# Patient Record
Sex: FEMALE | Race: WHITE | NOT HISPANIC OR LATINO | Employment: STUDENT | URBAN - METROPOLITAN AREA
[De-identification: names, ages, dates, MRNs, and addresses within clinical notes are randomized per-mention and may not be internally consistent; named-entity substitution may affect disease eponyms.]

---

## 2024-01-16 ENCOUNTER — TELEPHONE (OUTPATIENT)
Dept: PEDIATRIC HEMATOLOGY/ONCOLOGY | Facility: HOSPITAL | Age: 24
End: 2024-01-16
Payer: COMMERCIAL

## 2024-01-16 NOTE — TELEPHONE ENCOUNTER
Second attempt to call Saundra to set up March appointment for MRI and visit with Dr Schulte.  No answer. Message left on voicemail to call this RN's direct line at her earliest convenience.

## 2024-01-29 DIAGNOSIS — D49.7 GLIONEURONAL TUMOR: ICD-10-CM

## 2024-01-31 PROBLEM — R29.898 COMPLAINTS OF LEG WEAKNESS: Status: ACTIVE | Noted: 2024-01-31

## 2024-01-31 PROBLEM — R51.9 CHRONIC DAILY HEADACHE: Status: ACTIVE | Noted: 2024-01-31

## 2024-01-31 PROBLEM — G93.9 BRAIN LESION: Status: ACTIVE | Noted: 2024-01-31

## 2024-01-31 PROBLEM — R42 DIZZINESS: Status: ACTIVE | Noted: 2024-01-31

## 2024-01-31 PROBLEM — R51.9 HEADACHE, OCCIPITAL: Status: ACTIVE | Noted: 2024-01-31

## 2024-01-31 PROBLEM — R56.9 SEIZURE-LIKE ACTIVITY (MULTI): Status: ACTIVE | Noted: 2024-01-31

## 2024-01-31 PROBLEM — L81.3 CAFE-AU-LAIT SPOTS: Status: ACTIVE | Noted: 2024-01-31

## 2024-02-13 PROBLEM — D49.7 GLIONEURONAL TUMOR: Status: ACTIVE | Noted: 2024-02-13

## 2024-02-13 NOTE — PROGRESS NOTES
Patient ID: Saundra Lentz is a 23 y.o. female.  Referring Physician: Bulmaro Schulte MD  [id number] [facility]  Pediatrics-Hematology [place] [address]  Primary Care Provider: Dante Mcnamara MD    Date of Service:  2/21/2024    SUBJECTIVE:    History of Present Illness:  Saundra is a 24yo with an abnormal brain MRI in August 2017  (scan performed due to atypical headaches). 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    Due to radiographic progression, underwent radiation therapy, completed in September 2018.      Saundra was last seen in pediatric neuro-oncology clinic in March, 2023.  She returns today for MRI and routine clinical f/u. Here with her mom.      Since her last visit she denies any major illnesses, hospitalizations or ED visits. Denies major headaches and states she gets headaches a few times per month. No diplopia, or blurry vision. Denies nausea, vomiting or diarrhea. No seizures that she knows      No changes to PMH, SH or FH since last visit.                 Oncology History:    Oncology History Overview Note   Glioneuronal tumor, R basal ganglia  Conformal proton radiation to 50.4Gy (7/16/18-8/14/18)     Glioneuronal tumor   2/13/2024 Initial Diagnosis    Glioneuronal tumor         Past Medical / Family / Social History:  Past Medical, Family, and Social History reviewed and unchanged since the last visit.    Review of Systems   Constitutional: Negative.    HENT:  Negative.     Eyes: Negative.    Respiratory: Negative.     Cardiovascular: Negative.    Gastrointestinal: Negative.    Endocrine: Negative.    Genitourinary: Negative.     Musculoskeletal: Negative.    Skin: Negative.    Neurological: Negative.    Hematological: Negative.    Psychiatric/Behavioral: Negative.     All other systems reviewed and are negative.      Home Medication Adherence:  Adherence with home medication regimen: yes    Oral Chemotherapy / Oncology Related Therapy:  Is the patient  "prescribed oral chemotherapy or oncology related therapy:  No    OBJECTIVE:    VS:  /82 (BP Location: Right arm, Patient Position: Sitting, BP Cuff Size: Large adult)   Pulse 87   Temp 36.2 °C (97.2 °F) (Tympanic)   Resp 20   Ht 1.647 m (5' 4.84\")   Wt 80.4 kg (177 lb 4 oz)   BMI 29.64 kg/m²   BSA: 1.92 meters squared  Pain:       Physical Exam  Vitals reviewed.   HENT:      Head: Normocephalic.      Mouth/Throat:      Mouth: Mucous membranes are moist.   Eyes:      Extraocular Movements: Extraocular movements intact.      Conjunctiva/sclera: Conjunctivae normal.      Pupils: Pupils are equal, round, and reactive to light.   Cardiovascular:      Rate and Rhythm: Normal rate and regular rhythm.      Pulses: Normal pulses.      Heart sounds: Normal heart sounds.   Pulmonary:      Effort: Pulmonary effort is normal.      Breath sounds: Normal breath sounds.   Abdominal:      General: Bowel sounds are normal.      Palpations: Abdomen is soft.   Musculoskeletal:         General: Normal range of motion.      Cervical back: Normal range of motion.   Skin:     General: Skin is warm.   Neurological:      Mental Status: She is alert and oriented to person, place, and time.         Performance Status:       Imaging:  The pertinent imaging results were reviewed and discussed with the patient.  MR brain w and wo IV contrast    Result Date: 2/21/2024  Interpreted By:  Gilberto Walls, STUDY: MR BRAIN W AND WO IV CONTRAST; ;  2/21/2024 1:55 pm   INDICATION: Signs/Symptoms: 24yo with glioneuronal tumor s/p radiation therapy 5.5 years from completion due for tumor surveillance imaging  D49.7: Glioneuronal tumor.   COMPARISON: MRI brain from 03/15/2023.   ACCESSION NUMBER(S): [id number]   ORDERING CLINICIAN: ADDISON MELVIN   TECHNIQUE: MRI of the brain was performed with the acquisition of axial diffusion-weighted, axial T1, axial FLAIR, axial T2 gradient echo, axial T2 fat saturated, axial T1 fat saturated " postcontrast sequence, and volumetric axial T1 post contrast sequence with multiplanar reformats.   Contrast: 14 mL of Dotarem was injected intravenously.   FINDINGS: There is a stable cystic mass located within the right thalamus which measures 0.9 x 1.6 cm. There is a stable biopsy tract extending superiorly from this mass into the right parietal bone via the right frontal lobe. There is stable susceptibility artifact along the rim of this lesion with associated slight T1 hyperintense signal and slight enhancement along the lateral aspect.   Otherwise, there is no signal abnormality within the brain parenchyma. There is no acute intracranial hemorrhage or infarct. There is no abnormal extra-axial fluid collection.   The ventricles, sulci, and basilar cisterns are normal in size, shape, and configuration.   The visualized paranasal sinuses mastoid air cells are essentially clear, noting minimal mucosal thickening within the ethmoid air cells. The mastoid air cells are clear.       Stable cystic lesion located within the right thalamus.   This study was interpreted at [facility].   MACRO: None   Signed by: Gilberto Walls 2/21/2024 2:51 PM Dictation workstation:   [id number]      ASSESSMENT and PLAN:  22yo with glioneuronal tumor with history of slight radiographic progression who is now ~6 years post-completion of proton RT.    Saundra is well appearing in clinic today with stable MRI.     Oncology: S/p conformal proton radiation. (9/2018) due to radiographic progressive disease.  No evidence for progressive disease on exam or by imaging today. Stable imaging. Will continue with routine tumor surveillance  imaging annually, due 3/2025.     Neurology:  Will transition to Adult Neurology.      Optho: Encouraged annual Optho exams due to hx of brain tumor, last exam a few years ago per report.      RTC: 3/2025 for MRI, PE     Yolis York, APRN-CNP, DNP

## 2024-02-21 ENCOUNTER — HOSPITAL ENCOUNTER (OUTPATIENT)
Dept: RADIOLOGY | Facility: HOSPITAL | Age: 24
Discharge: HOME | End: 2024-02-21
Payer: COMMERCIAL

## 2024-02-21 ENCOUNTER — HOSPITAL ENCOUNTER (OUTPATIENT)
Dept: PEDIATRIC HEMATOLOGY/ONCOLOGY | Facility: HOSPITAL | Age: 24
Discharge: HOME | End: 2024-02-21
Payer: COMMERCIAL

## 2024-02-21 VITALS
SYSTOLIC BLOOD PRESSURE: 133 MMHG | WEIGHT: 177.25 LBS | HEART RATE: 87 BPM | TEMPERATURE: 97.2 F | BODY MASS INDEX: 29.53 KG/M2 | RESPIRATION RATE: 20 BRPM | DIASTOLIC BLOOD PRESSURE: 82 MMHG | HEIGHT: 65 IN

## 2024-02-21 DIAGNOSIS — D49.7 NEOPLASM OF UNSPECIFIED BEHAVIOR OF ENDOCRINE GLANDS AND OTHER PARTS OF NERVOUS SYSTEM: ICD-10-CM

## 2024-02-21 DIAGNOSIS — D49.7 GLIONEURONAL TUMOR: ICD-10-CM

## 2024-02-21 PROCEDURE — 2550000001 HC RX 255 CONTRASTS: Performed by: PEDIATRICS

## 2024-02-21 PROCEDURE — 99214 OFFICE O/P EST MOD 30 MIN: CPT | Performed by: NURSE PRACTITIONER

## 2024-02-21 PROCEDURE — A9575 INJ GADOTERATE MEGLUMI 0.1ML: HCPCS | Performed by: PEDIATRICS

## 2024-02-21 PROCEDURE — 70553 MRI BRAIN STEM W/O & W/DYE: CPT | Performed by: RADIOLOGY

## 2024-02-21 PROCEDURE — 70553 MRI BRAIN STEM W/O & W/DYE: CPT

## 2024-02-21 RX ORDER — GADOTERATE MEGLUMINE 376.9 MG/ML
14 INJECTION INTRAVENOUS
Status: COMPLETED | OUTPATIENT
Start: 2024-02-21 | End: 2024-02-21

## 2024-02-21 RX ADMIN — GADOTERATE MEGLUMINE 14 ML: 376.9 INJECTION INTRAVENOUS at 13:41

## 2024-02-21 ASSESSMENT — ENCOUNTER SYMPTOMS
DEPRESSION: 0
OCCASIONAL FEELINGS OF UNSTEADINESS: 0
LOSS OF SENSATION IN FEET: 0

## 2024-02-21 ASSESSMENT — PAIN SCALES - GENERAL: PAINLEVEL: 0-NO PAIN

## 2024-02-25 ASSESSMENT — ENCOUNTER SYMPTOMS
HEMATOLOGIC/LYMPHATIC NEGATIVE: 1
CONSTITUTIONAL NEGATIVE: 1
EYES NEGATIVE: 1
MUSCULOSKELETAL NEGATIVE: 1
PSYCHIATRIC NEGATIVE: 1
CARDIOVASCULAR NEGATIVE: 1
RESPIRATORY NEGATIVE: 1
ENDOCRINE NEGATIVE: 1
NEUROLOGICAL NEGATIVE: 1
GASTROINTESTINAL NEGATIVE: 1

## 2024-02-25 NOTE — ADDENDUM NOTE
Encounter addended by: Yolis York, APRN-CNP, DNP on: 2/25/2024 8:42 AM   Actions taken: SmartForm saved, Clinical Note Signed

## 2024-03-13 NOTE — ADDENDUM NOTE
Encounter addended by: Bulmaro Schulte MD on: 3/13/2024 9:23 AM   Actions taken: Level of Service modified, Cosign clinical note with attestation

## 2024-03-26 ENCOUNTER — HOSPITAL ENCOUNTER (OUTPATIENT)
Dept: RADIOLOGY | Facility: EXTERNAL LOCATION | Age: 24
Discharge: HOME | End: 2024-03-26

## 2024-03-26 DIAGNOSIS — R05.9 COUGH IN ADULT PATIENT: ICD-10-CM

## 2024-11-15 DIAGNOSIS — D49.7 GLIONEURONAL TUMOR: ICD-10-CM

## 2025-01-11 ENCOUNTER — OFFICE VISIT (OUTPATIENT)
Dept: URGENT CARE | Age: 25
End: 2025-01-11
Payer: COMMERCIAL

## 2025-01-11 VITALS
DIASTOLIC BLOOD PRESSURE: 81 MMHG | TEMPERATURE: 101.1 F | RESPIRATION RATE: 20 BRPM | OXYGEN SATURATION: 96 % | SYSTOLIC BLOOD PRESSURE: 143 MMHG | HEART RATE: 122 BPM

## 2025-01-11 DIAGNOSIS — R52 BODY ACHES: ICD-10-CM

## 2025-01-11 DIAGNOSIS — J10.1 INFLUENZA A: Primary | ICD-10-CM

## 2025-01-11 LAB
POC RAPID INFLUENZA A: POSITIVE
POC RAPID INFLUENZA B: NEGATIVE
POC SARS-COV-2 AG BINAX: NORMAL

## 2025-01-11 RX ORDER — OSELTAMIVIR PHOSPHATE 75 MG/1
75 CAPSULE ORAL EVERY 12 HOURS
Qty: 10 CAPSULE | Refills: 0 | Status: SHIPPED | OUTPATIENT
Start: 2025-01-11 | End: 2025-01-16

## 2025-01-11 ASSESSMENT — ENCOUNTER SYMPTOMS
SINUS PAIN: 0
FEVER: 1
RHINORRHEA: 0
NAUSEA: 0
SORE THROAT: 0
MYALGIAS: 1
SHORTNESS OF BREATH: 0
APPETITE CHANGE: 0
COUGH: 0
ACTIVITY CHANGE: 0
ABDOMINAL PAIN: 0
FATIGUE: 0
SINUS PRESSURE: 0
ARTHRALGIAS: 0

## 2025-01-11 NOTE — LETTER
January 11, 2025     Patient: Saundra Lentz   YOB: 2000   Date of Visit: 1/11/2025       To Whom It May Concern:    Saundra Lentz was seen in my clinic on 1/11/2025 . Please excuse Saundra for her absence from work on this day to make the appointment. May return to work 1/14/2025.    If you have any questions or concerns, please don't hesitate to call.         Sincerely,         Kandice Orellana PA-C        CC: No Recipients

## 2025-01-11 NOTE — PROGRESS NOTES
Subjective   Patient ID: Saundra Lentz is a 24 y.o. female. They present today with a chief complaint of Illness (Started yesterday, headaches, body aches.).    History of Present Illness  25 YO F C/O FEVER, BODY ACHES, STARTED LAST NIGHT.  NO OTC MEDICATIONS TODAY           Past Medical History  Allergies as of 01/11/2025    (No Known Allergies)       (Not in a hospital admission)       Past Medical History:   Diagnosis Date    Disorder of brain, unspecified 05/31/2018    Brain lesion    Other symptoms and signs involving the musculoskeletal system 10/05/2017    Complaints of leg weakness       History reviewed. No pertinent surgical history.         Review of Systems  Review of Systems   Constitutional:  Positive for fever. Negative for activity change, appetite change and fatigue.   HENT:  Negative for congestion, postnasal drip, rhinorrhea, sinus pressure, sinus pain and sore throat.    Respiratory:  Negative for cough and shortness of breath.    Gastrointestinal:  Negative for abdominal pain and nausea.   Musculoskeletal:  Positive for myalgias. Negative for arthralgias.   Skin:  Negative for rash.                                  Objective    Vitals:    01/11/25 1610   BP: 143/81   BP Location: Left arm   Patient Position: Sitting   BP Cuff Size: Adult   Pulse: (!) 122   Resp: 20   Temp: (!) 38.4 °C (101.1 °F)   TempSrc: Oral   SpO2: 96%     No LMP recorded (lmp unknown).    Physical Exam  Vitals and nursing note reviewed.   Constitutional:       Appearance: Normal appearance.   HENT:      Right Ear: Tympanic membrane, ear canal and external ear normal.      Left Ear: Tympanic membrane, ear canal and external ear normal.      Mouth/Throat:      Mouth: Mucous membranes are moist.      Pharynx: Oropharynx is clear.   Eyes:      Conjunctiva/sclera: Conjunctivae normal.   Cardiovascular:      Rate and Rhythm: Normal rate and regular rhythm.      Heart sounds: Normal heart sounds.   Pulmonary:      Effort:  Pulmonary effort is normal.      Breath sounds: Normal breath sounds.   Musculoskeletal:      Cervical back: Normal range of motion and neck supple.   Skin:     General: Skin is warm and dry.   Neurological:      Mental Status: She is alert and oriented to person, place, and time.         Procedures    Point of Care Test & Imaging Results from this visit  Results for orders placed or performed in visit on 01/11/25   POCT Influenza A/B manually resulted   Result Value Ref Range    POC Rapid Influenza A Positive (A) Negative    POC Rapid Influenza B Negative Negative   POCT Covid-19 Rapid Antigen   Result Value Ref Range    POC ISSA-COV-2 AG  Presumptive negative test for SARS-CoV-2 (no antigen detected)     Presumptive negative test for SARS-CoV-2 (no antigen detected)      No results found.    Diagnostic study results (if any) were reviewed by Kandice Orellana PA-C.    Assessment/Plan   Allergies, medications, history, and pertinent labs/EKGs/Imaging reviewed by Kandice Orellana PA-C.     Medical Decision Making      Orders and Diagnoses  Diagnoses and all orders for this visit:  Influenza A  Body aches  -     POCT Influenza A/B manually resulted  -     POCT Covid-19 Rapid Antigen      Medical Admin Record      Patient disposition: Home    Electronically signed by Kandice Orellana PA-C  4:19 PM

## 2025-02-18 ASSESSMENT — ENCOUNTER SYMPTOMS
EYES NEGATIVE: 1
MUSCULOSKELETAL NEGATIVE: 1
HEMATOLOGIC/LYMPHATIC NEGATIVE: 1
CARDIOVASCULAR NEGATIVE: 1
GASTROINTESTINAL NEGATIVE: 1
RESPIRATORY NEGATIVE: 1
ENDOCRINE NEGATIVE: 1
PSYCHIATRIC NEGATIVE: 1
CONSTITUTIONAL NEGATIVE: 1

## 2025-02-18 NOTE — PROGRESS NOTES
"Patient ID: Saundra Lentz is a 24 y.o. female.  Referring Physician: No referring provider defined for this encounter.  Primary Care Provider: Dante Mcnamara MD    Date of Service:  2/19/2025    SUBJECTIVE:    History of Present Illness:  Saundra is a 23yo with an abnormal brain MRI in August 2017  (scan performed due to atypical headaches). 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 Due to radiographic progression, underwent radiation therapy, completed in September 2018.      Saundra was last seen in pediatric neuro-oncology clinic in February, 2024.  She returns today for MRI and routine clinical f/u. Here with her mom and mom's camping friend.     Mom reports she continues to have tons of \"episodes.\" During 11/19 - 2/17 she has had 18 episodes. She describes the episodes as the following: she is sleeping and all of a sudden her eyes are closed and she \"mentally wakes up\" she cannot move any part of her body and she tries to force her body to move and feels like a weight on her body and she cannot open her eyes. Mom reports there was a time her previous boyfriend witnessed it and she had a shaking episode.     She has awake and sleep episodes. Awake episodes she has a weird feeling and body goes into a panic, skin is red, this all started prior to tumor and started on zoloft. Not as frequent.    Headache L eye x 3 days    Also had weird pain in head, mom unsure if it was stress related. She doesn't think it is a migraine. Sharp pain to L side of head 30 sec on and 30 sec off.     No majorillnesses, had the flu last month. Working as a . She does not have a PCP, has not seen neurology or optho in the past year.     No changes to PMH, SH or FH since last visit.               Oncology History:    Oncology History Overview Note   Glioneuronal tumor, R basal ganglia  Conformal proton radiation to 50.4Gy (7/16/18-8/14/18)     Glioneuronal tumor   2/13/2024 Initial Diagnosis    Glioneuronal " "tumor         Past Medical / Family / Social History:  Past Medical, Family, and Social History reviewed and unchanged since the last visit.    Review of Systems   Constitutional: Negative.    HENT:  Negative.     Eyes: Negative.    Respiratory: Negative.     Cardiovascular: Negative.    Gastrointestinal: Negative.    Endocrine: Negative.    Genitourinary: Negative.     Musculoskeletal: Negative.    Skin: Negative.    Neurological:  Positive for headaches.        \"Episodes\" see HPI   Hematological: Negative.    Psychiatric/Behavioral: Negative.     All other systems reviewed and are negative.      Home Medication Adherence:  Adherence with home medication regimen: yes    Oral Chemotherapy / Oncology Related Therapy:  Is the patient prescribed oral chemotherapy or oncology related therapy:  No    OBJECTIVE:    VS:  BP (!) 147/91 (BP Location: Right arm, Patient Position: Sitting, BP Cuff Size: Adult) Comment: first reading 149/95  Pulse 86   Temp 37.1 °C (98.7 °F) (Oral)   Resp 20   Ht 1.64 m (5' 4.57\")   Wt 84.5 kg (186 lb 4.6 oz)   BMI 31.42 kg/m²   BSA: 1.96 meters squared  Pain:       Physical Exam  Vitals reviewed.   HENT:      Head: Normocephalic.      Mouth/Throat:      Mouth: Mucous membranes are moist.   Eyes:      Extraocular Movements: Extraocular movements intact.      Conjunctiva/sclera: Conjunctivae normal.      Pupils: Pupils are equal, round, and reactive to light.   Cardiovascular:      Rate and Rhythm: Normal rate and regular rhythm.      Pulses: Normal pulses.      Heart sounds: Normal heart sounds.   Pulmonary:      Effort: Pulmonary effort is normal.      Breath sounds: Normal breath sounds.   Abdominal:      General: Bowel sounds are normal.      Palpations: Abdomen is soft.   Musculoskeletal:         General: Normal range of motion.      Cervical back: Normal range of motion.   Skin:     General: Skin is warm.   Neurological:      Mental Status: She is alert and oriented to person, place, " "and time.         Performance Status:       Imaging:  The pertinent imaging results were reviewed and discussed with the patient.  No MRI head results found for the past 14 days     ASSESSMENT and PLAN:  23yo with glioneuronal tumor with history of slight radiographic progression who is now ~6.5 years post-completion of proton RT.    Saundra is well appearing in clinic today with prelim MRI stable (final read pending).    Oncology: S/p conformal proton radiation. (9/2018) due to radiographic progressive disease.  No evidence for progressive disease on exam today, final MRI read is pending.  Will continue with routine tumor surveillance  imaging annually, due 3/2026.     Neurology:  Will transition to Adult Neurology. Referral placed today, encouraged an appointment with neurology due to her descriptions of \"episodes.\"     Optho: Encouraged annual Optho exams due to hx of brain tumor     RTC: 3/2026 for MRI, PE.    Yolis York, APRN-CNP, DNP  "

## 2025-02-19 ENCOUNTER — HOSPITAL ENCOUNTER (OUTPATIENT)
Dept: RADIOLOGY | Facility: HOSPITAL | Age: 25
Discharge: HOME | End: 2025-02-19
Payer: COMMERCIAL

## 2025-02-19 ENCOUNTER — HOSPITAL ENCOUNTER (OUTPATIENT)
Dept: PEDIATRIC HEMATOLOGY/ONCOLOGY | Facility: HOSPITAL | Age: 25
Discharge: HOME | End: 2025-02-19
Payer: COMMERCIAL

## 2025-02-19 VITALS
HEART RATE: 86 BPM | TEMPERATURE: 98.7 F | SYSTOLIC BLOOD PRESSURE: 147 MMHG | RESPIRATION RATE: 20 BRPM | WEIGHT: 186.29 LBS | DIASTOLIC BLOOD PRESSURE: 91 MMHG | BODY MASS INDEX: 31.04 KG/M2 | HEIGHT: 65 IN

## 2025-02-19 DIAGNOSIS — R51.9 HEADACHE, OCCIPITAL: ICD-10-CM

## 2025-02-19 DIAGNOSIS — G93.9 BRAIN LESION: Primary | ICD-10-CM

## 2025-02-19 DIAGNOSIS — R56.9 SEIZURE-LIKE ACTIVITY (MULTI): ICD-10-CM

## 2025-02-19 DIAGNOSIS — D49.7 GLIONEURONAL TUMOR: ICD-10-CM

## 2025-02-19 PROCEDURE — A9575 INJ GADOTERATE MEGLUMI 0.1ML: HCPCS | Performed by: PEDIATRICS

## 2025-02-19 PROCEDURE — 70553 MRI BRAIN STEM W/O & W/DYE: CPT

## 2025-02-19 PROCEDURE — 2550000001 HC RX 255 CONTRASTS: Performed by: PEDIATRICS

## 2025-02-19 RX ORDER — SERTRALINE HYDROCHLORIDE 50 MG/1
50 TABLET, FILM COATED ORAL DAILY
COMMUNITY

## 2025-02-19 RX ORDER — GADOTERATE MEGLUMINE 376.9 MG/ML
15 INJECTION INTRAVENOUS
Status: COMPLETED | OUTPATIENT
Start: 2025-02-19 | End: 2025-02-19

## 2025-02-19 RX ADMIN — GADOTERATE MEGLUMINE 15 ML: 376.9 INJECTION INTRAVENOUS at 14:17

## 2025-02-19 ASSESSMENT — PATIENT HEALTH QUESTIONNAIRE - PHQ9
2. FEELING DOWN, DEPRESSED OR HOPELESS: NOT AT ALL
SUM OF ALL RESPONSES TO PHQ9 QUESTIONS 1 AND 2: 0
1. LITTLE INTEREST OR PLEASURE IN DOING THINGS: NOT AT ALL

## 2025-02-19 ASSESSMENT — COLUMBIA-SUICIDE SEVERITY RATING SCALE - C-SSRS
2. HAVE YOU ACTUALLY HAD ANY THOUGHTS OF KILLING YOURSELF?: NO
6. HAVE YOU EVER DONE ANYTHING, STARTED TO DO ANYTHING, OR PREPARED TO DO ANYTHING TO END YOUR LIFE?: NO
1. IN THE PAST MONTH, HAVE YOU WISHED YOU WERE DEAD OR WISHED YOU COULD GO TO SLEEP AND NOT WAKE UP?: NO

## 2025-02-19 ASSESSMENT — PAIN SCALES - GENERAL: PAINLEVEL_OUTOF10: 0-NO PAIN

## 2025-02-19 ASSESSMENT — ENCOUNTER SYMPTOMS
LOSS OF SENSATION IN FEET: 0
HEADACHES: 1
OCCASIONAL FEELINGS OF UNSTEADINESS: 0
DEPRESSION: 0

## 2025-03-16 ENCOUNTER — HOSPITAL ENCOUNTER (EMERGENCY)
Facility: HOSPITAL | Age: 25
Discharge: HOME | End: 2025-03-16
Payer: COMMERCIAL

## 2025-03-16 VITALS
BODY MASS INDEX: 29.88 KG/M2 | HEART RATE: 92 BPM | HEIGHT: 64 IN | TEMPERATURE: 97.5 F | SYSTOLIC BLOOD PRESSURE: 137 MMHG | RESPIRATION RATE: 17 BRPM | DIASTOLIC BLOOD PRESSURE: 99 MMHG | WEIGHT: 175 LBS | OXYGEN SATURATION: 96 %

## 2025-03-16 DIAGNOSIS — N30.00 ACUTE CYSTITIS WITHOUT HEMATURIA: Primary | ICD-10-CM

## 2025-03-16 LAB
APPEARANCE UR: CLEAR
BACTERIA #/AREA URNS AUTO: ABNORMAL /HPF
BILIRUB UR STRIP.AUTO-MCNC: NEGATIVE MG/DL
COLOR UR: YELLOW
GLUCOSE UR STRIP.AUTO-MCNC: NORMAL MG/DL
HCG UR QL IA.RAPID: NEGATIVE
KETONES UR STRIP.AUTO-MCNC: ABNORMAL MG/DL
LEUKOCYTE ESTERASE UR QL STRIP.AUTO: ABNORMAL
MUCOUS THREADS #/AREA URNS AUTO: ABNORMAL /LPF
NITRITE UR QL STRIP.AUTO: NEGATIVE
PH UR STRIP.AUTO: 6 [PH]
PROT UR STRIP.AUTO-MCNC: ABNORMAL MG/DL
RBC # UR STRIP.AUTO: NEGATIVE MG/DL
RBC #/AREA URNS AUTO: ABNORMAL /HPF
SP GR UR STRIP.AUTO: 1.04
SQUAMOUS #/AREA URNS AUTO: ABNORMAL /HPF
UROBILINOGEN UR STRIP.AUTO-MCNC: NORMAL MG/DL
WBC #/AREA URNS AUTO: ABNORMAL /HPF

## 2025-03-16 PROCEDURE — 99283 EMERGENCY DEPT VISIT LOW MDM: CPT

## 2025-03-16 PROCEDURE — 81001 URINALYSIS AUTO W/SCOPE: CPT

## 2025-03-16 PROCEDURE — 87086 URINE CULTURE/COLONY COUNT: CPT | Mod: WESLAB

## 2025-03-16 PROCEDURE — 2500000001 HC RX 250 WO HCPCS SELF ADMINISTERED DRUGS (ALT 637 FOR MEDICARE OP)

## 2025-03-16 PROCEDURE — 81025 URINE PREGNANCY TEST: CPT

## 2025-03-16 PROCEDURE — 2500000002 HC RX 250 W HCPCS SELF ADMINISTERED DRUGS (ALT 637 FOR MEDICARE OP, ALT 636 FOR OP/ED)

## 2025-03-16 RX ORDER — FLUCONAZOLE 100 MG/1
200 TABLET ORAL ONCE
Status: COMPLETED | OUTPATIENT
Start: 2025-03-16 | End: 2025-03-16

## 2025-03-16 RX ORDER — NITROFURANTOIN 25; 75 MG/1; MG/1
100 CAPSULE ORAL 2 TIMES DAILY
Qty: 10 CAPSULE | Refills: 0 | Status: SHIPPED | OUTPATIENT
Start: 2025-03-16 | End: 2025-03-21

## 2025-03-16 RX ORDER — NITROFURANTOIN 25; 75 MG/1; MG/1
100 CAPSULE ORAL EVERY 12 HOURS SCHEDULED
Status: DISCONTINUED | OUTPATIENT
Start: 2025-03-16 | End: 2025-03-17 | Stop reason: HOSPADM

## 2025-03-16 RX ADMIN — FLUCONAZOLE 200 MG: 100 TABLET ORAL at 22:27

## 2025-03-16 RX ADMIN — NITROFURANTOIN MONOHYDRATE/MACROCRYSTALS 100 MG: 75; 25 CAPSULE ORAL at 22:28

## 2025-03-16 ASSESSMENT — LIFESTYLE VARIABLES
TOTAL SCORE: 0
HAVE YOU EVER FELT YOU SHOULD CUT DOWN ON YOUR DRINKING: NO
EVER HAD A DRINK FIRST THING IN THE MORNING TO STEADY YOUR NERVES TO GET RID OF A HANGOVER: NO
HAVE PEOPLE ANNOYED YOU BY CRITICIZING YOUR DRINKING: NO
EVER FELT BAD OR GUILTY ABOUT YOUR DRINKING: NO

## 2025-03-16 ASSESSMENT — COLUMBIA-SUICIDE SEVERITY RATING SCALE - C-SSRS
6. HAVE YOU EVER DONE ANYTHING, STARTED TO DO ANYTHING, OR PREPARED TO DO ANYTHING TO END YOUR LIFE?: NO
2. HAVE YOU ACTUALLY HAD ANY THOUGHTS OF KILLING YOURSELF?: NO
1. IN THE PAST MONTH, HAVE YOU WISHED YOU WERE DEAD OR WISHED YOU COULD GO TO SLEEP AND NOT WAKE UP?: NO

## 2025-03-16 ASSESSMENT — PAIN - FUNCTIONAL ASSESSMENT: PAIN_FUNCTIONAL_ASSESSMENT: 0-10

## 2025-03-16 ASSESSMENT — PAIN SCALES - GENERAL: PAINLEVEL_OUTOF10: 0 - NO PAIN

## 2025-03-19 LAB — BACTERIA UR CULT: ABNORMAL

## 2025-03-19 NOTE — ED PROVIDER NOTES
HPI   Chief Complaint   Patient presents with    Vaginal Itching    Vaginal Discharge       HPI  Patient is a 24-year-old female presents ED for vaginal itching and discharge.  Patient states she think she there is a yeast infection or UTI.  She denies any chance that she is pregnant or has an STD.  She denies any fever chills or pelvic pain.  No other acute complaints.      Patient History   Past Medical History:   Diagnosis Date    Disorder of brain, unspecified 05/31/2018    Brain lesion    Other symptoms and signs involving the musculoskeletal system 10/05/2017    Complaints of leg weakness     No past surgical history on file.  No family history on file.  Social History     Tobacco Use    Smoking status: Not on file    Smokeless tobacco: Not on file   Substance Use Topics    Alcohol use: Not on file    Drug use: Not on file       Physical Exam   ED Triage Vitals [03/16/25 2109]   Temperature Heart Rate Respirations BP   36.4 °C (97.5 °F) 92 17 (!) 137/99      Pulse Ox Temp Source Heart Rate Source Patient Position   96 % Temporal Monitor --      BP Location FiO2 (%)     -- --       Physical Exam  Vitals reviewed.   Constitutional:       General: She is not in acute distress.     Appearance: Normal appearance. She is not ill-appearing.   HENT:      Head: Normocephalic and atraumatic.   Eyes:      Extraocular Movements: Extraocular movements intact.   Cardiovascular:      Rate and Rhythm: Normal rate and regular rhythm.      Heart sounds: Normal heart sounds.   Pulmonary:      Effort: Pulmonary effort is normal.      Breath sounds: Normal breath sounds.   Abdominal:      General: Abdomen is flat.   Musculoskeletal:         General: Normal range of motion.      Cervical back: Normal range of motion and neck supple.   Skin:     General: Skin is warm and dry.   Neurological:      General: No focal deficit present.      Mental Status: She is alert and oriented to person, place, and time.   Psychiatric:         Mood  and Affect: Mood normal.         Behavior: Behavior normal.           ED Course & MDM   Diagnoses as of 03/19/25 1309   Acute cystitis without hematuria                 No data recorded     Shon Coma Scale Score: 15 (03/16/25 2229 : Kiki Jonas LPN)                           Medical Decision Making  Parts of this chart have been completed using voice recognition software. Please excuse any errors of transcription.  My thought process and reason for plan has been formulated from the time that I saw the patient until the time of disposition and is not specific to one specific moment during their visit and furthermore my MDM encompasses this entire chart and not only this text box.    HPI:   A medically appropriate HPI was obtained, outlined above.    Saundra Lentz is a  24 y.o. female    Chief Complaint   Patient presents with    Vaginal Itching    Vaginal Discharge       Past Medical History:   Diagnosis Date    Disorder of brain, unspecified 05/31/2018    Brain lesion    Other symptoms and signs involving the musculoskeletal system 10/05/2017    Complaints of leg weakness       No past surgical history on file.         No family history on file.    No Known Allergies    Current Outpatient Medications   Medication Instructions    nitrofurantoin, macrocrystal-monohydrate, (Macrobid) 100 mg capsule 100 mg, oral, 2 times daily    sertraline (ZOLOFT) 50 mg, Daily   for details    Exam:   No data found.    A medically appropriate exam performed, outlined above, given the known history and presentation.    EKG/Cardiac monitor:   If EKG was done and, it was interpreted by attending physician, see their note for ED course for more detail.    Medications given during visit:  Medications   fluconazole (Diflucan) tablet 200 mg (200 mg oral Given 3/16/25 2227)        Diagnostic/tests:  Labs Reviewed   URINE CULTURE - Abnormal       Result Value    Urine Culture >=100,000 CFU/mL Escherichia coli (*)    URINALYSIS WITH  REFLEX CULTURE AND MICROSCOPIC - Abnormal    Color, Urine Yellow      Appearance, Urine Clear      Specific Gravity, Urine 1.036 (*)     pH, Urine 6.0      Protein, Urine 30 (1+) (*)     Glucose, Urine Normal      Blood, Urine NEGATIVE      Ketones, Urine TRACE (*)     Bilirubin, Urine NEGATIVE      Urobilinogen, Urine Normal      Nitrite, Urine NEGATIVE      Leukocyte Esterase, Urine 500 Robert/uL (*)    MICROSCOPIC ONLY, URINE - Abnormal    WBC, Urine 21-50 (*)     RBC, Urine 3-5      Squamous Epithelial Cells, Urine 1-9 (SPARSE)      Bacteria, Urine 1+ (*)     Mucus, Urine 1+     HCG, URINE, QUALITATIVE - Normal    HCG, Urine NEGATIVE     URINALYSIS WITH REFLEX CULTURE AND MICROSCOPIC    Narrative:     The following orders were created for panel order Urinalysis with Reflex Culture and Microscopic.  Procedure                               Abnormality         Status                     ---------                               -----------         ------                     Urinalysis with Reflex C...[[id number]]  Abnormal            Final result               Extra Urine Gray Tube[[id number]]                                                         Please view results for these tests on the individual orders.   EXTRA URINE GRAY TUBE        No orders to display          MDM Summary:  Patient has a UTI.  Prescribed Macrobid.  Diflucan given at patient's request for possible yeast infection.    We have discussed the diagnosis and risks, and we agree with discharging home to follow-up with appropriate physician as directed. We also discussed returning to the Emergency Department immediately if new or worsening symptoms occur. We have discussed the symptoms which are most concerning that necessitate immediate return. Pt symptoms have been well controlled here and the patient is safe for discharge with appropriate outpatient follow up. The patient has verbalized understanding to return to ER without delay for new or worsening  pains or for any other symptoms or concerns. I utilized the discharge clinical management tool provided [employer] to help estimate risk of negative outcome for this patient.      Disposition:  ED Prescriptions       Medication Sig Dispense Start Date End Date Auth. Provider    nitrofurantoin, macrocrystal-monohydrate, (Macrobid) 100 mg capsule Take 1 capsule (100 mg) by mouth 2 times a day for 5 days. 10 capsule 3/16/2025 3/21/2025 Atilio Ramsay PA-C              Procedure  Procedures     Atilio Ramsay PA-C  03/19/25 1311